# Patient Record
Sex: MALE | Race: WHITE | NOT HISPANIC OR LATINO | Employment: FULL TIME | URBAN - METROPOLITAN AREA
[De-identification: names, ages, dates, MRNs, and addresses within clinical notes are randomized per-mention and may not be internally consistent; named-entity substitution may affect disease eponyms.]

---

## 2020-11-28 ENCOUNTER — OFFICE VISIT (OUTPATIENT)
Dept: URGENT CARE | Facility: PHYSICIAN GROUP | Age: 50
End: 2020-11-28
Payer: COMMERCIAL

## 2020-11-28 VITALS
BODY MASS INDEX: 32.51 KG/M2 | SYSTOLIC BLOOD PRESSURE: 160 MMHG | WEIGHT: 240 LBS | DIASTOLIC BLOOD PRESSURE: 70 MMHG | RESPIRATION RATE: 16 BRPM | HEART RATE: 97 BPM | HEIGHT: 72 IN | TEMPERATURE: 98.3 F | OXYGEN SATURATION: 98 %

## 2020-11-28 DIAGNOSIS — S91.301A OPEN WOUND OF RIGHT FOOT, INITIAL ENCOUNTER: ICD-10-CM

## 2020-11-28 PROCEDURE — 99214 OFFICE O/P EST MOD 30 MIN: CPT | Performed by: FAMILY MEDICINE

## 2020-11-28 RX ORDER — CEPHALEXIN 500 MG/1
500 CAPSULE ORAL 4 TIMES DAILY
COMMUNITY

## 2020-11-28 RX ORDER — NEOMYCIN SULFATE, POLYMYXIN B SULFATE, BACITRACIN ZINC, HYDROCORTISONE 3.5; 10000; 400; 1 MG/G; [USP'U]/G; [USP'U]/G; MG/G
OINTMENT OPHTHALMIC 2 TIMES DAILY
COMMUNITY

## 2020-11-28 RX ORDER — SULFAMETHOXAZOLE AND TRIMETHOPRIM 800; 160 MG/1; MG/1
1 TABLET ORAL 2 TIMES DAILY
COMMUNITY

## 2020-11-28 ASSESSMENT — ENCOUNTER SYMPTOMS
SORE THROAT: 0
VOMITING: 0
SHORTNESS OF BREATH: 0
COUGH: 0
FEVER: 0

## 2020-11-28 ASSESSMENT — PAIN SCALES - GENERAL: PAINLEVEL: 5=MODERATE PAIN

## 2020-11-28 NOTE — PROGRESS NOTES
Subjective:     Ruthy Meredith is a 50 y.o. male who presents for Foot Pain (R foot swollen popped blister on botton of foot, x1 week) and Toe Pain (L foot big toe, x2 weeks)    HPI  Pt presents for evaluation of a new problem   Pt walking the dog without socks which caused a blister on the bottom of his right foot  Has a single large blister over the first MTP and 2 smaller blisters over the lateral dorsal forefoot  Patient has diabetic neuropathy and cannot feel his feet very well  These areas are not painful for him  Has noticed some mild yellow exudate from the area  No surrounding erythema in the area  Patient currently taking Bactrim from [facility] urgent [facility] for his other foot, and Keflex from his PCP who prescribed this over the phone  Patient has wound care appointment for his left foot from [facility] in a few days    Review of Systems   Constitutional: Negative for fever.   HENT: Negative for sore throat.    Respiratory: Negative for cough and shortness of breath.    Gastrointestinal: Negative for vomiting.   Skin: Positive for rash.     PMH: Hx DM II   MEDS:   Current Outpatient Medications:   •  lvnj-vcinbiqc-qjn-hydrocort (CORTISPORIN) 1 % Ointment, Apply  to both eyes 2 times a day., Disp: , Rfl:   •  sulfamethoxazole-trimethoprim (BACTRIM DS) 800-160 MG tablet, Take 1 Tab by mouth 2 times a day., Disp: , Rfl:   •  cephALEXin (KEFLEX) 500 MG Cap, Take 500 mg by mouth 4 times a day., Disp: , Rfl:   ALLERGIES: No Known Allergies  SURGHX: None  SOCHX: No tobacco use  FH: Family history was reviewed, not contributing to acute blister     Objective:   /70   Pulse 97   Temp 36.8 °C (98.3 °F) (Temporal)   Resp 16   Ht 1.829 m (6')   Wt 108.9 kg (240 lb)   SpO2 98%   BMI 32.55 kg/m²     Physical Exam  Constitutional:       General: He is not in acute distress.     Appearance: He is well-developed. He is not diaphoretic.   HENT:      Head: Normocephalic and atraumatic.   Pulmonary:       Effort: Pulmonary effort is normal.   Skin:     General: Skin is warm and dry.      Comments: Approximately 3 cm x 3 cm full-thickness skin wound on right plantar forefoot with 2 smaller 1 cm x 1 cm open full-thickness skin wounds just laterally, good granulation tissue at base, no surrounding erythema, no purulent exudate present   Neurological:      Mental Status: He is alert and oriented to person, place, and time.   Psychiatric:         Behavior: Behavior normal.         Thought Content: Thought content normal.         Judgment: Judgment normal.       Assessment/Plan:   Assessment    1. Open wound of right foot, initial encounter  - REFERRAL TO WOUND CLINIC    Patient with an open wound over right plantar foot.  Reviewed wound care and wound was wrapped in office today.  Patient already attending wound care next week for his other foot and advised to have them look at this right foot as well.  Have officially placed a referral in the system for his right foot.  Advised that he has no sign of infection, and being on 2 separate antibiotics is too much for just prophylaxis.  Advised to discontinue Keflex as he has been on the Bactrim longer.  Finish course of Bactrim and follow-up with wound care.

## 2020-12-02 ENCOUNTER — HOSPITAL ENCOUNTER (OUTPATIENT)
Dept: HOSPITAL 8 - WOUND | Age: 50
Discharge: HOME | End: 2020-12-02
Attending: INTERNAL MEDICINE
Payer: COMMERCIAL

## 2020-12-02 DIAGNOSIS — L97.212: ICD-10-CM

## 2020-12-02 DIAGNOSIS — I10: ICD-10-CM

## 2020-12-02 DIAGNOSIS — E11.622: ICD-10-CM

## 2020-12-02 DIAGNOSIS — E11.621: Primary | ICD-10-CM

## 2020-12-02 DIAGNOSIS — L97.222: ICD-10-CM

## 2020-12-02 DIAGNOSIS — L97.521: ICD-10-CM

## 2020-12-02 DIAGNOSIS — E11.40: ICD-10-CM

## 2020-12-02 DIAGNOSIS — L84: ICD-10-CM

## 2020-12-02 DIAGNOSIS — L97.511: ICD-10-CM

## 2020-12-02 DIAGNOSIS — E78.5: ICD-10-CM

## 2020-12-02 PROCEDURE — 99215 OFFICE O/P EST HI 40 MIN: CPT

## 2020-12-02 PROCEDURE — 99205 OFFICE O/P NEW HI 60 MIN: CPT

## 2020-12-02 PROCEDURE — 97597 DBRDMT OPN WND 1ST 20 CM/<: CPT

## 2020-12-09 ENCOUNTER — HOSPITAL ENCOUNTER (OUTPATIENT)
Dept: HOSPITAL 8 - WOUND | Age: 50
Discharge: HOME | End: 2020-12-09
Attending: INTERNAL MEDICINE
Payer: COMMERCIAL

## 2020-12-09 DIAGNOSIS — L84: ICD-10-CM

## 2020-12-09 DIAGNOSIS — L97.222: ICD-10-CM

## 2020-12-09 DIAGNOSIS — I10: ICD-10-CM

## 2020-12-09 DIAGNOSIS — L97.212: ICD-10-CM

## 2020-12-09 DIAGNOSIS — E11.621: Primary | ICD-10-CM

## 2020-12-09 DIAGNOSIS — L97.521: ICD-10-CM

## 2020-12-09 DIAGNOSIS — E11.40: ICD-10-CM

## 2020-12-09 DIAGNOSIS — E78.5: ICD-10-CM

## 2020-12-09 DIAGNOSIS — E11.622: ICD-10-CM

## 2020-12-09 DIAGNOSIS — L97.511: ICD-10-CM

## 2020-12-09 PROCEDURE — 97597 DBRDMT OPN WND 1ST 20 CM/<: CPT

## 2020-12-30 ENCOUNTER — HOSPITAL ENCOUNTER (OUTPATIENT)
Dept: HOSPITAL 8 - WOUND | Age: 50
Discharge: HOME | End: 2020-12-30
Attending: INTERNAL MEDICINE
Payer: COMMERCIAL

## 2020-12-30 DIAGNOSIS — L97.511: ICD-10-CM

## 2020-12-30 DIAGNOSIS — E11.621: Primary | ICD-10-CM

## 2020-12-30 DIAGNOSIS — E78.5: ICD-10-CM

## 2020-12-30 DIAGNOSIS — E11.622: ICD-10-CM

## 2020-12-30 DIAGNOSIS — L84: ICD-10-CM

## 2020-12-30 DIAGNOSIS — L97.212: ICD-10-CM

## 2020-12-30 DIAGNOSIS — E11.40: ICD-10-CM

## 2020-12-30 DIAGNOSIS — L97.222: ICD-10-CM

## 2020-12-30 DIAGNOSIS — I10: ICD-10-CM

## 2020-12-30 PROCEDURE — 97597 DBRDMT OPN WND 1ST 20 CM/<: CPT

## 2021-01-13 ENCOUNTER — HOSPITAL ENCOUNTER (OUTPATIENT)
Dept: HOSPITAL 8 - WOUND | Age: 51
Discharge: HOME | End: 2021-01-13
Attending: INTERNAL MEDICINE
Payer: COMMERCIAL

## 2021-01-13 DIAGNOSIS — L97.222: ICD-10-CM

## 2021-01-13 DIAGNOSIS — E11.40: ICD-10-CM

## 2021-01-13 DIAGNOSIS — I10: ICD-10-CM

## 2021-01-13 DIAGNOSIS — E11.622: ICD-10-CM

## 2021-01-13 DIAGNOSIS — E78.5: ICD-10-CM

## 2021-01-13 DIAGNOSIS — L97.212: ICD-10-CM

## 2021-01-13 DIAGNOSIS — L84: ICD-10-CM

## 2021-01-13 DIAGNOSIS — E11.621: Primary | ICD-10-CM

## 2021-01-13 DIAGNOSIS — L97.511: ICD-10-CM

## 2021-01-13 PROCEDURE — 99213 OFFICE O/P EST LOW 20 MIN: CPT

## 2021-02-01 ENCOUNTER — HOSPITAL ENCOUNTER (OUTPATIENT)
Dept: HOSPITAL 8 - WOUND | Age: 51
Discharge: HOME | End: 2021-02-01
Attending: INTERNAL MEDICINE
Payer: COMMERCIAL

## 2021-02-01 DIAGNOSIS — L97.518: ICD-10-CM

## 2021-02-01 DIAGNOSIS — L97.212: ICD-10-CM

## 2021-02-01 DIAGNOSIS — L97.222: ICD-10-CM

## 2021-02-01 DIAGNOSIS — L84: ICD-10-CM

## 2021-02-01 DIAGNOSIS — E11.622: ICD-10-CM

## 2021-02-01 DIAGNOSIS — E78.5: ICD-10-CM

## 2021-02-01 DIAGNOSIS — I10: ICD-10-CM

## 2021-02-01 DIAGNOSIS — E11.40: ICD-10-CM

## 2021-02-01 DIAGNOSIS — E11.621: Primary | ICD-10-CM

## 2021-02-01 PROCEDURE — 99213 OFFICE O/P EST LOW 20 MIN: CPT

## 2022-12-03 ENCOUNTER — HOSPITAL ENCOUNTER (OUTPATIENT)
Dept: LAB | Facility: MEDICAL CENTER | Age: 52
End: 2022-12-03
Attending: FAMILY MEDICINE
Payer: COMMERCIAL

## 2022-12-03 LAB
ALBUMIN SERPL BCP-MCNC: 4.5 G/DL (ref 3.2–4.9)
ALBUMIN/GLOB SERPL: 1.6 G/DL
ALP SERPL-CCNC: 66 U/L (ref 30–99)
ALT SERPL-CCNC: 54 U/L (ref 2–50)
ANION GAP SERPL CALC-SCNC: 11 MMOL/L (ref 7–16)
AST SERPL-CCNC: 47 U/L (ref 12–45)
BILIRUB SERPL-MCNC: 0.6 MG/DL (ref 0.1–1.5)
BUN SERPL-MCNC: 16 MG/DL (ref 8–22)
CALCIUM SERPL-MCNC: 9.7 MG/DL (ref 8.5–10.5)
CHLORIDE SERPL-SCNC: 105 MMOL/L (ref 96–112)
CHOLEST SERPL-MCNC: 160 MG/DL (ref 100–199)
CO2 SERPL-SCNC: 23 MMOL/L (ref 20–33)
CREAT SERPL-MCNC: 0.77 MG/DL (ref 0.5–1.4)
CREAT UR-MCNC: 40.01 MG/DL
EST. AVERAGE GLUCOSE BLD GHB EST-MCNC: 134 MG/DL
GFR SERPLBLD CREATININE-BSD FMLA CKD-EPI: 107 ML/MIN/1.73 M 2
GLOBULIN SER CALC-MCNC: 2.8 G/DL (ref 1.9–3.5)
GLUCOSE SERPL-MCNC: 121 MG/DL (ref 65–99)
HBA1C MFR BLD: 6.3 % (ref 4–5.6)
HDLC SERPL-MCNC: 64 MG/DL
LDLC SERPL CALC-MCNC: 61 MG/DL
MICROALBUMIN UR-MCNC: 8.4 MG/DL
MICROALBUMIN/CREAT UR: 210 MG/G (ref 0–30)
POTASSIUM SERPL-SCNC: 4.8 MMOL/L (ref 3.6–5.5)
PROT SERPL-MCNC: 7.3 G/DL (ref 6–8.2)
SODIUM SERPL-SCNC: 139 MMOL/L (ref 135–145)
TRIGL SERPL-MCNC: 176 MG/DL (ref 0–149)

## 2022-12-03 PROCEDURE — 83036 HEMOGLOBIN GLYCOSYLATED A1C: CPT

## 2022-12-03 PROCEDURE — 82570 ASSAY OF URINE CREATININE: CPT

## 2022-12-03 PROCEDURE — 36415 COLL VENOUS BLD VENIPUNCTURE: CPT

## 2022-12-03 PROCEDURE — 80053 COMPREHEN METABOLIC PANEL: CPT

## 2022-12-03 PROCEDURE — 82043 UR ALBUMIN QUANTITATIVE: CPT

## 2022-12-03 PROCEDURE — 80061 LIPID PANEL: CPT

## 2024-02-27 PROBLEM — M79.672 PAIN OF LEFT FOOT: Status: ACTIVE | Noted: 2024-02-27
